# Patient Record
Sex: FEMALE | Race: WHITE | NOT HISPANIC OR LATINO | URBAN - METROPOLITAN AREA
[De-identification: names, ages, dates, MRNs, and addresses within clinical notes are randomized per-mention and may not be internally consistent; named-entity substitution may affect disease eponyms.]

---

## 2020-08-14 ENCOUNTER — IMPORTED ENCOUNTER (OUTPATIENT)
Dept: URBAN - METROPOLITAN AREA CLINIC 50 | Facility: CLINIC | Age: 50
End: 2020-08-14

## 2020-08-17 ENCOUNTER — IMPORTED ENCOUNTER (OUTPATIENT)
Dept: URBAN - METROPOLITAN AREA CLINIC 50 | Facility: CLINIC | Age: 50
End: 2020-08-17

## 2020-08-17 NOTE — PATIENT DISCUSSION
"""Will not finalize glasses prescription due to patient interested in cataract surgery next available. """

## 2020-08-18 ENCOUNTER — IMPORTED ENCOUNTER (OUTPATIENT)
Dept: URBAN - METROPOLITAN AREA CLINIC 50 | Facility: CLINIC | Age: 50
End: 2020-08-18

## 2020-08-28 ENCOUNTER — IMPORTED ENCOUNTER (OUTPATIENT)
Dept: URBAN - METROPOLITAN AREA CLINIC 50 | Facility: CLINIC | Age: 50
End: 2020-08-28

## 2020-09-09 ENCOUNTER — IMPORTED ENCOUNTER (OUTPATIENT)
Dept: URBAN - METROPOLITAN AREA CLINIC 50 | Facility: CLINIC | Age: 50
End: 2020-09-09

## 2020-09-09 NOTE — PATIENT DISCUSSION
"""S/P IOL OD: Tecnis  ZKB00 24 +ORA/Femto/Arcs +Omidria. Continue post operative instructions and drops per schedule.  """

## 2020-09-18 ENCOUNTER — IMPORTED ENCOUNTER (OUTPATIENT)
Dept: URBAN - METROPOLITAN AREA CLINIC 50 | Facility: CLINIC | Age: 50
End: 2020-09-18

## 2020-09-23 ENCOUNTER — IMPORTED ENCOUNTER (OUTPATIENT)
Dept: URBAN - METROPOLITAN AREA CLINIC 50 | Facility: CLINIC | Age: 50
End: 2020-09-23

## 2020-10-02 ENCOUNTER — IMPORTED ENCOUNTER (OUTPATIENT)
Dept: URBAN - METROPOLITAN AREA CLINIC 50 | Facility: CLINIC | Age: 50
End: 2020-10-02

## 2020-10-02 NOTE — PATIENT DISCUSSION
"""S/P IOL OS: Tecnis  ZKB00 23.5 +Femto/Arcs +Omidria. Continue post operative instructions and drops per schedule.  """

## 2020-10-20 ENCOUNTER — IMPORTED ENCOUNTER (OUTPATIENT)
Dept: URBAN - METROPOLITAN AREA CLINIC 50 | Facility: CLINIC | Age: 50
End: 2020-10-20

## 2020-10-28 ENCOUNTER — IMPORTED ENCOUNTER (OUTPATIENT)
Dept: URBAN - METROPOLITAN AREA CLINIC 50 | Facility: CLINIC | Age: 50
End: 2020-10-28

## 2020-11-02 ENCOUNTER — IMPORTED ENCOUNTER (OUTPATIENT)
Dept: URBAN - METROPOLITAN AREA CLINIC 50 | Facility: CLINIC | Age: 50
End: 2020-11-02

## 2021-01-11 ENCOUNTER — IMPORTED ENCOUNTER (OUTPATIENT)
Dept: URBAN - METROPOLITAN AREA CLINIC 50 | Facility: CLINIC | Age: 51
End: 2021-01-11

## 2021-04-17 ASSESSMENT — VISUAL ACUITY
OD_OTHER: >20/400.
OD_SC: 20/25+
OS_SC: 20/50
OS_SC: 20/20-2
OD_OTHER: 20/20. 20/25.
OS_PH: @ 16 IN
OD_PH: @ 15 IN
OD_SC: 20/250
OS_CC: J1+@ 18 IN
OD_SC: 20/30-2
OS_PH: @ 18 IN
OD_PH: 20/20
OS_SC: 20/60
OS_PH: 20/30
OD_BAT: >20/400
OS_BAT: >20/400
OD_BAT: 20/20
OS_SC: 20/20-1
OD_PH: @ 16 IN
OS_OTHER: >20/400. >20/400.
OD_OTHER: >20/400.
OS_CC: J1+@ 15 IN
OD_CC: J1+@ 16 IN
OS_CC: 20/200
OD_OTHER: >20/400.
OD_PH: @ 18 IN
OD_SC: 20/200-
OS_PH: 20/40
OD_BAT: >20/400
OS_SC: 20/25
OD_CC: J1+@ 18 IN
OS_SC: 20/50
OD_SC: 20/30
OS_BAT: >20/400
OS_OTHER: >20/400.
OS_PH: @ 15 IN
OD_CC: J1+@ 15 IN
OD_PH: 20/70
OD_BAT: >20/400
OS_BAT: >20/400
OS_OTHER: >20/400. >20/400.
OS_OTHER: 20/25. 20/30.
OD_SC: 20/25-2
OS_BAT: 20/25
OD_SC: 20/30
OS_CC: J1+@ 16 IN

## 2021-04-17 ASSESSMENT — TONOMETRY
OD_IOP_MMHG: 19
OD_IOP_MMHG: 18
OS_IOP_MMHG: 16
OD_IOP_MMHG: 18
OS_IOP_MMHG: 22
OS_IOP_MMHG: 18
OS_IOP_MMHG: 19
OS_IOP_MMHG: 18
OD_IOP_MMHG: 19
OS_IOP_MMHG: 18
OD_IOP_MMHG: 11
OD_IOP_MMHG: 17
OS_IOP_MMHG: 17
OD_IOP_MMHG: 16

## 2022-01-05 ENCOUNTER — PREPPED CHART (OUTPATIENT)
Dept: URBAN - METROPOLITAN AREA CLINIC 53 | Facility: CLINIC | Age: 52
End: 2022-01-05

## 2022-01-05 NOTE — PATIENT DISCUSSION
"""Continue Restasis both eyes twice a day . ""." Hemoptysis  11/15/2017    Active  Azael Alcantara  Sarcoidosis  11/15/2017    Active  Azael Alcantara

## 2022-01-17 ENCOUNTER — COMPREHENSIVE EXAM (OUTPATIENT)
Dept: URBAN - METROPOLITAN AREA CLINIC 53 | Facility: CLINIC | Age: 52
End: 2022-01-17

## 2022-01-17 DIAGNOSIS — H16.223: ICD-10-CM

## 2022-01-17 DIAGNOSIS — H26.493: ICD-10-CM

## 2022-01-17 PROCEDURE — 92014 COMPRE OPH EXAM EST PT 1/>: CPT

## 2022-01-17 ASSESSMENT — VISUAL ACUITY
OS_GLARE: 20/30
OD_GLARE: 20/50
OD_GLARE: 20/60
OU_SC: 20/20
OU_SC: J1+
OS_SC: 20/20
OD_SC: 20/30-2
OS_GLARE: 20/30

## 2022-01-17 ASSESSMENT — TONOMETRY
OS_IOP_MMHG: 18
OD_IOP_MMHG: 17